# Patient Record
Sex: MALE | Race: WHITE | NOT HISPANIC OR LATINO | Employment: UNEMPLOYED | URBAN - METROPOLITAN AREA
[De-identification: names, ages, dates, MRNs, and addresses within clinical notes are randomized per-mention and may not be internally consistent; named-entity substitution may affect disease eponyms.]

---

## 2023-12-18 ENCOUNTER — APPOINTMENT (OUTPATIENT)
Dept: PRIMARY CARE | Facility: CLINIC | Age: 38
End: 2023-12-18
Payer: MEDICAID

## 2024-04-04 ENCOUNTER — OFFICE VISIT (OUTPATIENT)
Dept: URGENT CARE | Facility: CLINIC | Age: 39
End: 2024-04-04
Payer: MEDICAID

## 2024-04-04 VITALS
OXYGEN SATURATION: 96 % | RESPIRATION RATE: 14 BRPM | TEMPERATURE: 97.7 F | SYSTOLIC BLOOD PRESSURE: 127 MMHG | DIASTOLIC BLOOD PRESSURE: 78 MMHG | HEART RATE: 93 BPM

## 2024-04-04 DIAGNOSIS — K04.7 ABSCESS, PERIAPICAL: Primary | ICD-10-CM

## 2024-04-04 PROCEDURE — 99212 OFFICE O/P EST SF 10 MIN: CPT

## 2024-04-04 RX ORDER — CHLORHEXIDINE GLUCONATE ORAL RINSE 1.2 MG/ML
15 SOLUTION DENTAL AS NEEDED
Qty: 120 ML | Refills: 0 | Status: SHIPPED | OUTPATIENT
Start: 2024-04-04 | End: 2024-04-18

## 2024-04-04 RX ORDER — AMOXICILLIN 500 MG/1
500 CAPSULE ORAL 2 TIMES DAILY
Qty: 20 CAPSULE | Refills: 0 | Status: SHIPPED | OUTPATIENT
Start: 2024-04-04 | End: 2024-04-14

## 2024-04-04 RX ORDER — METHADONE HYDROCHLORIDE 10 MG/5ML
180 SOLUTION ORAL
COMMUNITY

## 2024-04-04 NOTE — PROGRESS NOTES
[facility] URGENT CARE [facility] NOTE:      Name: Donald Cunningham, 39 y.o.    CSN:[id number]   MRN:[id number]    PCP: No primary care provider on file.    ALL:  No Known Allergies    History:    Chief Complaint: Dental Pain (R SIDE SWELLING, PAIN X 2 YEARS)    Encounter Date: 4/4/2024      HPI: The history was obtained from the patient. Donlad is a 39 y.o. male, who presents with a chief complaint of Dental Pain (R SIDE SWELLING, PAIN X 2 YEARS).  He states that he has been seen here multiple times for the same issue over the last couple of years.  States he does not have a dentist at this time due to not having insurance.  He does state that he does now have Medicaid and he is going to find a dentist.  He states that he has pain to the periapical region of teeth 3 through 5.  He does state that he does not have many teeth left.  He states it does hurt to chew but he is still able to tolerate solids.  He denies fevers, nausea, vomiting, headache, trouble swallowing, chest pain, shortness of breath.    PMHx:    History reviewed. No pertinent past medical history.           Current Outpatient Medications   Medication Sig Dispense Refill    methadone (Dolophine) 10 mg/5 mL solution Take 90 mL (180 mg) by mouth.      amoxicillin (Amoxil) 500 mg capsule Take 1 capsule (500 mg) by mouth 2 times a day for 10 days. 20 capsule 0    chlorhexidine (Peridex) 0.12 % solution Use 15 mL in the mouth or throat if needed for wound care for up to 14 days. 120 mL 0     No current facility-administered medications for this visit.         PMSx:  History reviewed. No pertinent surgical history.    Fam Hx: No family history on file.    SOC. Hx:     Social History     Socioeconomic History    Marital status: Single     Spouse name: Not on file    Number of children: Not on file    Years of education: Not on file    Highest education level: Not on file   Occupational History    Not on file   Tobacco Use    Smoking status:  Every Day     Types: Cigarettes    Smokeless tobacco: Never   Substance and Sexual Activity    Alcohol use: Not on file    Drug use: Not on file    Sexual activity: Not on file   Other Topics Concern    Not on file   Social History Narrative    Not on file     Social Determinants of Health     Financial Resource Strain: Not on file   Food Insecurity: Not on file   Transportation Needs: Not on file   Physical Activity: Not on file   Stress: Not on file   Social Connections: Not on file   Intimate Partner Violence: Not on file   Housing Stability: Not on file         Vitals:    04/04/24 1243   BP: 127/78   Pulse: 93   Resp: 14   Temp: 36.5 °C (97.7 °F)   SpO2: 96%                Physical Exam  Vitals reviewed.   Constitutional:       Appearance: Normal appearance.   HENT:      Right Ear: Tympanic membrane normal.      Left Ear: Tympanic membrane normal.      Nose: Nose normal.      Mouth/Throat:      Mouth: Mucous membranes are moist.      Dentition: Abnormal dentition. Dental tenderness, gingival swelling and dental caries present.      Pharynx: Oropharynx is clear.        Comments: Tenderness to palpation over T3-5.  Many of his teeth are missing.  There does appear to be a small periapical abscess present.  No other lesions in the mouth or pharynx.  Eyes:      Conjunctiva/sclera: Conjunctivae normal.      Pupils: Pupils are equal, round, and reactive to light.   Cardiovascular:      Rate and Rhythm: Normal rate and regular rhythm.   Pulmonary:      Effort: Pulmonary effort is normal.      Breath sounds: Normal breath sounds.   Skin:     General: Skin is warm.   Neurological:      General: No focal deficit present.      Mental Status: He is alert and oriented to person, place, and time.   Psychiatric:         Mood and Affect: Mood normal.         Behavior: Behavior normal.         I did personally review Donald's past medical history, surgical history, social history, as well as family history (when relevant).  In  this case, I also oversaw the his drug management by reviewing his medication list, allergy list, as well as the medications that I prescribed during the UC course and/or recommended as an out-patient (including possible OTC medications such as acetaminophen, NSAIDs , etc).    After reviewing the items above, I did look at previous medical documentation, such as recent hospitalizations, office visits, and/or recent consultations with PCP/specialist.                          SDOH:   Another factor that I considered in Donald's care was his Social Determinants of Health (SDOH). During this UC encounter, he did have social determinants of health. Those SDOH influencing Donald's care are: financial resource strain / unemployment, transportation needs, and addiction, I.e. alcohol, drugs, etc.    LABORATORY @ RADIOLOGICAL IMAGING (if done):     No results found for this or any previous visit (from the past 24 hour(s)).    UC COURSE/MEDICAL DECISION MAKING:    Donald is a 39 y.o., who presents with a working diagnosis of   1. Abscess, periapical      Donald was seen today for dental pain.  Diagnoses and all orders for this visit:  Abscess, periapical (Primary)  -     amoxicillin (Amoxil) 500 mg capsule; Take 1 capsule (500 mg) by mouth 2 times a day for 10 days.  -     chlorhexidine (Peridex) 0.12 % solution; Use 15 mL in the mouth or throat if needed for wound care for up to 14 days.  Current plan is to provide amoxicillin as well as Peridex for periapical abscess.  Discussed with patient that he needs to follow-up with dental as soon as possible.  I offered to refer patient to dental today and he declined stating he will find one on his own.  Discussed with patient if he develops fevers, nausea, vomiting, trouble swallowing or his condition worsens in any way he should report to the ER immediately.  Patient was agreeable to this plan.      Janie Wilson PA-C   Advanced Practice Provider  [facility]  URGENT CARE

## 2025-01-04 ENCOUNTER — OFFICE VISIT (OUTPATIENT)
Dept: URGENT CARE | Facility: CLINIC | Age: 40
End: 2025-01-04
Payer: MEDICAID

## 2025-01-04 VITALS
DIASTOLIC BLOOD PRESSURE: 71 MMHG | TEMPERATURE: 98.6 F | HEART RATE: 96 BPM | OXYGEN SATURATION: 96 % | RESPIRATION RATE: 16 BRPM | SYSTOLIC BLOOD PRESSURE: 104 MMHG

## 2025-01-04 DIAGNOSIS — K04.7 DENTAL ABSCESS: Primary | ICD-10-CM

## 2025-01-04 PROCEDURE — 99213 OFFICE O/P EST LOW 20 MIN: CPT

## 2025-01-04 RX ORDER — AMOXICILLIN 500 MG/1
500 CAPSULE ORAL 2 TIMES DAILY
Qty: 20 CAPSULE | Refills: 0 | Status: SHIPPED | OUTPATIENT
Start: 2025-01-04 | End: 2025-01-14

## 2025-01-04 RX ORDER — IBUPROFEN 800 MG/1
800 TABLET ORAL EVERY 8 HOURS PRN
Qty: 20 TABLET | Refills: 0 | Status: SHIPPED | OUTPATIENT
Start: 2025-01-04 | End: 2025-01-14

## 2025-01-04 NOTE — PROGRESS NOTES
[facility] URGENT CARE   [facility] NOTE:      Name: Donald Cunningham, 39 y.o.    CSN:[id number]   MRN:[id number]    PCP: No primary care provider on file.    ALL:  No Known Allergies    History:    Chief Complaint: Dental Pain (Gum swelling x yesterday)    Encounter Date: 1/4/2025      HPI: The history was obtained from the patient. Donald is a 39 y.o. male, who presents with a chief complaint of Dental Pain (Gum swelling x yesterday)     Patient presents with right sided facial swelling and pain that began 1 to 2 days ago.  He does have a history of multiple cavities with multiple dental infections over the last several years.  He was treated for abscess in April which did improve symptoms but he was unable to make it to the dentist as he does not have a car.  Denies any fevers, chills, nausea, vomiting, headache, difficulty swallowing, chest pain, shortness of breath    PMHx:    History reviewed. No pertinent past medical history.           Current Outpatient Medications   Medication Sig Dispense Refill    methadone (Dolophine) 10 mg/5 mL solution Take 90 mL (180 mg) by mouth.      amoxicillin (Amoxil) 500 mg capsule Take 1 capsule (500 mg) by mouth 2 times a day for 10 days. 20 capsule 0    ibuprofen 800 mg tablet Take 1 tablet (800 mg) by mouth every 8 hours if needed for moderate pain (4 - 6) for up to 10 days. 20 tablet 0     No current facility-administered medications for this visit.         PMSx:  History reviewed. No pertinent surgical history.    Fam Hx: No family history on file.    SOC. Hx:     Social History     Socioeconomic History    Marital status: Single     Spouse name: Not on file    Number of children: Not on file    Years of education: Not on file    Highest education level: Not on file   Occupational History    Not on file   Tobacco Use    Smoking status: Every Day     Types: Cigarettes    Smokeless tobacco: Never   Substance and Sexual Activity    Alcohol use: Not on file     Drug use: Not on file    Sexual activity: Not on file   Other Topics Concern    Not on file   Social History Narrative    Not on file     Social Drivers of Health     Financial Resource Strain: Not on file   Food Insecurity: Not on file   Transportation Needs: Not on file   Physical Activity: Not on file   Stress: Not on file   Social Connections: Not on file   Intimate Partner Violence: Not on file   Housing Stability: Not on file         Vitals:    01/04/25 0813   BP: 104/71   Pulse: 96   Resp: 16   Temp: 37 °C (98.6 °F)   SpO2: 96%                Physical Exam  Vitals and nursing note reviewed.   Constitutional:       General: He is not in acute distress.     Appearance: Normal appearance. He is not ill-appearing.   HENT:      Head: Normocephalic and atraumatic.      Mouth/Throat:      Mouth: Mucous membranes are moist.     Cardiovascular:      Rate and Rhythm: Normal rate and regular rhythm.      Heart sounds: Normal heart sounds.   Pulmonary:      Effort: Pulmonary effort is normal.      Breath sounds: Normal breath sounds.   Abdominal:      General: Bowel sounds are normal.   Skin:     General: Skin is warm and dry.      Capillary Refill: Capillary refill takes less than 2 seconds.   Neurological:      Mental Status: He is alert and oriented to person, place, and time.           LABORATORY @ RADIOLOGICAL IMAGING (if done):     No results found for this or any previous visit (from the past 24 hours).    ____________________________________________________________________    I did personally review Donald's past medical history, surgical history, social history, as well as family history (when relevant).  In this case, I also oversaw the his drug management by reviewing his medication list, allergy list, as well as the medications that I prescribed during the UC course and/or recommended as an out-patient (including possible OTC medications such as acetaminophen, NSAIDs , etc).    After reviewing the items above,  I did look at previous medical documentation, such as recent hospitalizations, office visits, and/or recent consultations with PCP/specialist.                          SDOH:   Another factor that I considered in Donald's care was his Social Determinants of Health (SDOH). During this UC encounter, he did not have social determinants of health. Those SDOH influencing Donald's care are: none      _____________________________________________________________________       COURSE/MEDICAL DECISION MAKING:    Donald is a 39 y.o., who presents with a working diagnosis of   1. Dental abscess        Donald was seen today for dental pain.  Diagnoses and all orders for this visit:  Dental abscess (Primary)  -     amoxicillin (Amoxil) 500 mg capsule; Take 1 capsule (500 mg) by mouth 2 times a day for 10 days.  -     ibuprofen 800 mg tablet; Take 1 tablet (800 mg) by mouth every 8 hours if needed for moderate pain (4 - 6) for up to 10 days.     Stressed the importance of following up with dentist for further evaluation of dental cavities at [facility].    Plan of care reviewed with patient.  If symptoms change and/or worsen will follow-up with primary care provider, return to urgent care, or go to the nearest emergency department for further evaluation.  Patient states understanding and is agreeable with plan of care.      YOU Otoole, DNP   Advanced Practice Provider  [facility] URGENT CARE    Please note: While the patient may or may not have received printed discharge paperwork, all relevant medical findings, test results, and treatment details are accessible through the electronic medical record system. The patient is encouraged to review their chart via the patient portal for comprehensive information and follow-up instructions.

## 2025-04-24 ENCOUNTER — OFFICE VISIT (OUTPATIENT)
Dept: URGENT CARE | Facility: CLINIC | Age: 40
End: 2025-04-24
Payer: MEDICAID

## 2025-04-24 VITALS
SYSTOLIC BLOOD PRESSURE: 138 MMHG | OXYGEN SATURATION: 98 % | WEIGHT: 245 LBS | DIASTOLIC BLOOD PRESSURE: 86 MMHG | RESPIRATION RATE: 16 BRPM | HEART RATE: 86 BPM | TEMPERATURE: 99.1 F | BODY MASS INDEX: 31.44 KG/M2 | HEIGHT: 74 IN

## 2025-04-24 DIAGNOSIS — R60.0 PERIPHERAL EDEMA: Primary | ICD-10-CM

## 2025-04-24 DIAGNOSIS — R06.83 SNORING: ICD-10-CM

## 2025-04-24 LAB
POC APPEARANCE, URINE: CLEAR
POC BILIRUBIN, URINE: NEGATIVE
POC BLOOD, URINE: NEGATIVE
POC COLOR, URINE: YELLOW
POC GLUCOSE, URINE: NEGATIVE MG/DL
POC KETONES, URINE: NEGATIVE MG/DL
POC LEUKOCYTES, URINE: NEGATIVE
POC NITRITE,URINE: NEGATIVE
POC PH, URINE: 5.5 PH
POC PROTEIN, URINE: NEGATIVE MG/DL
POC SPECIFIC GRAVITY, URINE: >=1.03
POC UROBILINOGEN, URINE: 0.2 EU/DL

## 2025-04-24 PROCEDURE — 81002 URINALYSIS NONAUTO W/O SCOPE: CPT | Performed by: PHYSICIAN ASSISTANT

## 2025-04-24 PROCEDURE — 99212 OFFICE O/P EST SF 10 MIN: CPT | Performed by: PHYSICIAN ASSISTANT

## 2025-04-24 RX ORDER — CHLORTHALIDONE 25 MG/1
25 TABLET ORAL DAILY
Qty: 30 TABLET | Refills: 0 | Status: SHIPPED | OUTPATIENT
Start: 2025-04-24 | End: 2025-05-24

## 2025-04-24 NOTE — PROGRESS NOTES
[facility] URGENT CARE   [facility] NOTE:    Addendum:  Patient also mentions that his girlfriend has commented on his snoring and has had a couple of apneic spells apparently, this also can contribute to his peripheral edema I have referred for a sleep study.      Name: Donald Cunningham, 40 y.o.    CSN:[id number]   MRN:[id number]    PCP: No Assigned PCP Generic Provider, MD    ALL:  Allergies[1]    History:    Chief Complaint: Ankle Pain (Pain in swelling in both ankles that radiate up both legs. )    Encounter Date: 4/24/2025  16:00    HPI: The history was obtained from the patient. Donald is a 40 y.o. male, who presents with a chief complaint of Ankle Pain (Pain in swelling in both ankles that radiate up both legs. ) denies any known injury, states that he is adhered to pretty high salt diet which is described as being primarily junk food, he is taking his prescribed therapies with methadone, denies any known history of heart related conditions, denies any issues with urinary output or concerns with his kidney function.  First time he is ever had this sort of situation is worried he could have an infection.        PMHx:    Medical History[2]         Current Medications[3]      PMSx:  Surgical History[4]    Fam Hx: Family History[5]    SOC. Hx:     Social History     Socioeconomic History    Marital status: Single     Spouse name: Not on file    Number of children: Not on file    Years of education: Not on file    Highest education level: Not on file   Occupational History    Not on file   Tobacco Use    Smoking status: Every Day     Types: Cigarettes    Smokeless tobacco: Never   Substance and Sexual Activity    Alcohol use: Not on file    Drug use: Not on file    Sexual activity: Not on file   Other Topics Concern    Not on file   Social History Narrative    Not on file     Social Drivers of Health     Financial Resource Strain: Not on file   Food Insecurity: Not on file   Transportation Needs: Not  on file   Physical Activity: Not on file   Stress: Not on file   Social Connections: Not on file   Intimate Partner Violence: Not on file   Housing Stability: Not on file         Vitals:    25 1541   BP: 138/86   Pulse: 86   Resp: 16   Temp: 37.3 °C (99.1 °F)   SpO2: 98%     111 kg (245 lb)          Physical Exam  Vitals reviewed.   Constitutional:       Appearance: Normal appearance. He is normal weight.   HENT:      Head: Normocephalic and atraumatic.      Nose: Nose normal.      Mouth/Throat:      Mouth: Mucous membranes are moist.   Eyes:      Extraocular Movements: Extraocular movements intact.   Neck:      Vascular: No JVD.      Comments: Neck distention noted to centimeters right sided  Cardiovascular:      Rate and Rhythm: Normal rate and regular rhythm.   Pulmonary:      Effort: Pulmonary effort is normal.      Breath sounds: Normal breath sounds.   Abdominal:      General: Abdomen is flat.   Musculoskeletal:         General: Normal range of motion.      Cervical back: Normal range of motion and neck supple.      Right lower le+ Pitting Edema present.      Left lower le+ Pitting Edema present.   Skin:     General: Skin is warm.      Capillary Refill: Capillary refill takes less than 2 seconds.   Neurological:      Mental Status: He is alert and oriented to person, place, and time.   Psychiatric:         Behavior: Behavior normal.           LABORATORY @ RADIOLOGICAL IMAGING (if done):     Results for orders placed or performed in visit on 25 (from the past 24 hours)   POCT UA (nonautomated w/o microscopy) manually resulted   Result Value Ref Range    POC Color, Urine Yellow Straw, Yellow, Light-Yellow    POC Appearance, Urine Clear Clear    POC Glucose, Urine NEGATIVE NEGATIVE mg/dl    POC Bilirubin, Urine NEGATIVE NEGATIVE    POC Ketones, Urine NEGATIVE NEGATIVE mg/dl    POC Specific Gravity, Urine >=1.030 1.005 - 1.035    POC Blood, Urine NEGATIVE NEGATIVE    POC PH, Urine 5.5 No  Reference Range Established PH    POC Protein, Urine NEGATIVE NEGATIVE mg/dl    POC Urobilinogen, Urine 0.2 0.2, 1.0 EU/DL    Poc Nitrite, Urine NEGATIVE NEGATIVE    POC Leukocytes, Urine NEGATIVE NEGATIVE       ____________________________________________________________________    I did personally review Donald's past medical history, surgical history, social history, as well as family history (when relevant).  In this case, I also oversaw the his drug management by reviewing his medication list, allergy list, as well as the medications that I prescribed during the UC course and/or recommended as an out-patient (including possible OTC medications such as acetaminophen, NSAIDs , etc).    After reviewing the items above, I did look at previous medical documentation, such as recent hospitalizations, office visits, and/or recent consultations with PCP/specialist.                          SDOH:   Another factor that I considered in Donald's care was his Social Determinants of Health (SDOH). During this UC encounter, he did have social determinants of health. Those SDOH influencing Donald's care are: addiction, I.e. alcohol, drugs, etc. and access to medical care      _____________________________________________________________________      UC COURSE/MEDICAL DECISION MAKING:    Donald is a 40 y.o., who presents with a working diagnosis of   1. Peripheral edema     with a differential to include: Right-sided heart failure, cor pulmonale, pulm hypertension, venous stasis disease, diet or medication induced peripheral edema, renal failure    Current plan is to provide patient with compression stockings, attempts were made to do a blood draw in the office which she did not tolerate as the veins per MA were collapsing and patient was feeling lightheaded.  Request that he push fluids and return tomorrow for a blood draw which he assured us he would.  Current plan would be to provide the patient with a diuretic, encourage low-salt  diet, encourage elevation of legs, and follow through with us or GP regarding results.  Patient was discharged.        Fab Bowling PA-C   Advanced Practice Provider  [facility] URGENT CARE    Please note: While the patient may or may not have received printed discharge paperwork, all relevant medical findings, test results, and treatment details are accessible through the electronic medical record system. The patient is encouraged to review their chart via the patient portal for comprehensive information and follow-up instructions.         [1] No Known Allergies  [2]   Past Medical History:  Diagnosis Date    Long-term current use of methadone for opiate dependence    [3]   Current Outpatient Medications   Medication Sig Dispense Refill    chlorthalidone (Hygroton) 25 mg tablet Take 1 tablet (25 mg) by mouth once daily. 30 tablet 0    methadone (Dolophine) 10 mg/5 mL solution Take 90 mL (180 mg) by mouth.       No current facility-administered medications for this visit.   [4] No past surgical history on file.  [5] No family history on file.

## 2025-04-25 ENCOUNTER — TELEPHONE (OUTPATIENT)
Dept: URGENT CARE | Facility: CLINIC | Age: 40
End: 2025-04-25
Payer: MEDICAID

## 2025-05-08 DIAGNOSIS — R60.0 PERIPHERAL EDEMA: ICD-10-CM
